# Patient Record
Sex: FEMALE | URBAN - METROPOLITAN AREA
[De-identification: names, ages, dates, MRNs, and addresses within clinical notes are randomized per-mention and may not be internally consistent; named-entity substitution may affect disease eponyms.]

---

## 2023-08-23 ENCOUNTER — APPOINTMENT (OUTPATIENT)
Dept: URBAN - METROPOLITAN AREA SURGERY 15 | Age: 28
Setting detail: DERMATOLOGY
End: 2023-08-24

## 2023-08-23 PROBLEM — C44.01 BASAL CELL CARCINOMA OF SKIN OF LIP: Status: ACTIVE | Noted: 2023-08-23

## 2023-08-23 PROCEDURE — OTHER MOHS SURGERY: OTHER

## 2023-08-23 PROCEDURE — OTHER COUNSELING: OTHER

## 2023-08-23 PROCEDURE — OTHER SURGICAL DECISION MAKING: OTHER

## 2023-08-23 PROCEDURE — 17311 MOHS 1 STAGE H/N/HF/G: CPT

## 2023-08-23 PROCEDURE — OTHER MIPS QUALITY: OTHER

## 2023-08-23 PROCEDURE — 13152 CMPLX RPR E/N/E/L 2.6-7.5 CM: CPT

## 2023-08-23 PROCEDURE — 17312 MOHS ADDL STAGE: CPT

## 2023-08-23 NOTE — PROCEDURE: SURGICAL DECISION MAKING
Discussion: After the mohs procedure was complete, a separate and distinct evaluation and management was performed for the resultant surgical defect for potential reconstruction using flap or graft.  Complications and risk of morbidity of each were discussed including (but not limited to) scarring, which could distort a free anatomic margin of the eyelid, nose, ear or lip.
Risk Assessment Explanation (Does Not Render In The Note): Clinical determination of the probability and/or consequences of an event, such as surgery. Clinical assessment of the level of risk is affected by the nature of the event under consideration for the patient. Modifier 57 is used to indicate an Evaluation and Management (E/M) service resulted in the initial decision to perform surgery either the day before a major surgery (90 day global) or the day of a major surgery.
Complexity (Necessary For Coding; Major - 90 Day Global With Some Exceptions; Minor - 10 Day Global): major
Initial Decision For Surgery?: Yes
Date Of Surgery - Today Or Tomorrow?: today

## 2023-08-23 NOTE — PROCEDURE: MOHS SURGERY
intact Pan-Cytokeratin - Negative Histology Text: CK staining demonstrates a normal density and pattern.

## 2023-08-30 ENCOUNTER — APPOINTMENT (OUTPATIENT)
Dept: URBAN - METROPOLITAN AREA SURGERY 15 | Age: 28
Setting detail: DERMATOLOGY
End: 2023-08-31

## 2023-08-30 DIAGNOSIS — Z48.02 ENCOUNTER FOR REMOVAL OF SUTURES: ICD-10-CM

## 2023-08-30 PROCEDURE — OTHER MIPS QUALITY: OTHER

## 2023-08-30 PROCEDURE — 99024 POSTOP FOLLOW-UP VISIT: CPT

## 2023-08-30 PROCEDURE — OTHER SUTURE REMOVAL (GLOBAL PERIOD): OTHER

## 2023-08-30 ASSESSMENT — LOCATION SIMPLE DESCRIPTION DERM: LOCATION SIMPLE: LEFT LIP

## 2023-08-30 ASSESSMENT — LOCATION ZONE DERM: LOCATION ZONE: LIP

## 2023-08-30 ASSESSMENT — LOCATION DETAILED DESCRIPTION DERM: LOCATION DETAILED: LEFT UPPER CUTANEOUS LIP

## 2023-08-30 NOTE — PROCEDURE: SUTURE REMOVAL (GLOBAL PERIOD)
Body Location Override (Optional - Billing Will Still Be Based On Selected Body Map Location If Applicable): left upper cutaneous lip
Detail Level: Detailed
Add 64294 Cpt? (Important Note: In 2017 The Use Of 09153 Is Being Tracked By Cms To Determine Future Global Period Reimbursement For Global Periods): yes

## 2023-09-29 NOTE — PROCEDURE: MOHS SURGERY
negative... Additional Notes: Pt to schedule exc Detail Level: Simple Render Risk Assessment In Note?: no Unique Flap 3 Name: Spear Flap

## 2024-04-01 NOTE — PROCEDURE: MOHS SURGERY
----- Message from Sirena Dumont MD sent at 3/31/2024  4:41 PM CDT -----  Please call the patient and inform him/her that the lesion we removed from the left elbow is not dangerous.  This is a verrucous keratosis which is a benign warty growth from the top of the skin.  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